# Patient Record
Sex: MALE | Race: OTHER | HISPANIC OR LATINO | URBAN - METROPOLITAN AREA
[De-identification: names, ages, dates, MRNs, and addresses within clinical notes are randomized per-mention and may not be internally consistent; named-entity substitution may affect disease eponyms.]

---

## 2022-09-19 ENCOUNTER — EMERGENCY (EMERGENCY)
Facility: HOSPITAL | Age: 32
LOS: 1 days | Discharge: HOME | End: 2022-09-19
Attending: STUDENT IN AN ORGANIZED HEALTH CARE EDUCATION/TRAINING PROGRAM
Payer: MEDICAID

## 2022-09-19 VITALS
OXYGEN SATURATION: 100 % | RESPIRATION RATE: 18 BRPM | DIASTOLIC BLOOD PRESSURE: 90 MMHG | TEMPERATURE: 99 F | WEIGHT: 160.06 LBS | SYSTOLIC BLOOD PRESSURE: 150 MMHG | HEART RATE: 71 BPM

## 2022-09-19 DIAGNOSIS — S42.301A UNSPECIFIED FRACTURE OF SHAFT OF HUMERUS, RIGHT ARM, INITIAL ENCOUNTER FOR CLOSED FRACTURE: ICD-10-CM

## 2022-09-19 PROCEDURE — 24505 CLTX HUMRL SHFT FX W/MNPJ: CPT | Mod: RT

## 2022-09-19 PROCEDURE — 73060 X-RAY EXAM OF HUMERUS: CPT

## 2022-09-19 PROCEDURE — 96372 THER/PROPH/DIAG INJ SC/IM: CPT | Mod: XU

## 2022-09-19 PROCEDURE — 99285 EMERGENCY DEPT VISIT HI MDM: CPT | Mod: 25

## 2022-09-19 PROCEDURE — 99284 EMERGENCY DEPT VISIT MOD MDM: CPT

## 2022-09-19 PROCEDURE — 73060 X-RAY EXAM OF HUMERUS: CPT | Mod: 26,RT,76

## 2022-09-19 RX ORDER — IBUPROFEN 200 MG
1 TABLET ORAL
Qty: 20 | Refills: 0
Start: 2022-09-19 | End: 2022-09-23

## 2022-09-19 RX ORDER — IBUPROFEN 200 MG
600 TABLET ORAL ONCE
Refills: 0 | Status: COMPLETED | OUTPATIENT
Start: 2022-09-19 | End: 2022-09-19

## 2022-09-19 RX ORDER — MORPHINE SULFATE 50 MG/1
6 CAPSULE, EXTENDED RELEASE ORAL ONCE
Refills: 0 | Status: DISCONTINUED | OUTPATIENT
Start: 2022-09-19 | End: 2022-09-19

## 2022-09-19 RX ADMIN — MORPHINE SULFATE 6 MILLIGRAM(S): 50 CAPSULE, EXTENDED RELEASE ORAL at 20:36

## 2022-09-19 RX ADMIN — MORPHINE SULFATE 6 MILLIGRAM(S): 50 CAPSULE, EXTENDED RELEASE ORAL at 19:31

## 2022-09-19 NOTE — PROCEDURE NOTE - NSICDXPROCEDURE_GEN_ALL_CORE_FT
PROCEDURES:  Closed reduction of fracture of shaft of right humerus 19-Sep-2022 19:38:33  Marcos Fleming

## 2022-09-19 NOTE — ED PROVIDER NOTE - PATIENT PORTAL LINK FT
You can access the FollowMyHealth Patient Portal offered by [facility] by registering at the following website: [email or web address]. By joining [employer]’s FollowMyHealth portal, you will also be able to view your health information using other applications (apps) compatible with our system.

## 2022-09-19 NOTE — PROCEDURE NOTE - NSPERIPVASCEVA_GEN_A_CORE
fingers/toes warm to touch/no paresthesia/swelling/no cyanosis of extremity/capillary refill time < 2 seconds/pre-application: responses intact/post-application: responses intact

## 2022-09-19 NOTE — ED PROVIDER NOTE - PHYSICAL EXAMINATION
Right humerus area with large soft tissue swelling and mid humerus tenderness. No clavicular or AC joint tend. no deformities of shoulder, radial ulnar pulses intact 2+. no spinal tenderness. Right humerus area with large soft tissue swelling and mid humerus tenderness. No sensory deficits. Difficulty moving thumb. Able to ABduct fingers.  No clavicular or AC joint tend. no deformities of shoulder, radial ulnar pulses intact 2+. No spinal tenderness.

## 2022-09-19 NOTE — PROCEDURE NOTE - NSFINDINGS_GEN_A_CORE
not in ideal, but in acceptable position. likely due to hematoma, musculature, and the bone spike preventing complete 100% reduction/reduction completed

## 2022-09-19 NOTE — ED PROVIDER NOTE - NSFOLLOWUPINSTRUCTIONS_ED_ALL_ED_FT
Follow up with Dr Tovar within 1 week.  If you experience any new or worsening symptoms or if you are concerned you can always come back to the emergency for a re-evaluation.  If there were any prescriptions given to you during the visit today take them as prescribed. If you have any questions you can ask the pharmacist.

## 2022-09-19 NOTE — ED PROVIDER NOTE - ATTENDING APP SHARED VISIT CONTRIBUTION OF CARE
I was physically present for the E/M service provided. I agree with above history, physical, and plan which I have reviewed and edited where appropriate. I was physically present for the key portions of the service provided.     well appearing male, no acute distress, normal work of breathing, right arm held in abduction. xray showing mid-humerus shaft fracture. patient seen by ortho, splinted and cleared for outpatient followup with Dr. Tovar.

## 2022-09-19 NOTE — ED PROVIDER NOTE - NS ED ATTENDING STATEMENT MOD
This was a shared visit with the LEEANNA. I reviewed and verified the documentation and independently performed the documented:

## 2022-09-19 NOTE — ED PROVIDER NOTE - OBJECTIVE STATEMENT
32 year old male with no significant pmhx presents with right arm injury earlier today. Patient was doing squats with bar bell. When he lost balance, he fell backwards and twisted his right arm backwards. No complaints of sharp pain in right humerus, numbness or other complaints. NKDA.

## 2022-09-19 NOTE — CONSULT NOTE ADULT - SUBJECTIVE AND OBJECTIVE BOX
Pt Name: HELEN SANCHEZ  MRN: [id number]    ORTHOPEDIC CONSULT    Orthopedic diagnosis:    32yMaleHPI: 32 year old male with no significant pmhx presents with right arm injury earlier today. Patient was doing squats with bar bell. When he lost balance, he fell backwards and twisted his right arm backwards. No complaints of sharp pain in right humerus, numbness or other complaints. NKDA.    Pt seen in ED. Right hand dominant s/p injury, falling back while holding a barbell over his shoulders. When he got up, he noticed his arm was strange and behind him. He wrapped up his arm and came to the ED. Pt denies Chest pain, SOB, dyspnea, paresthesias, N/V/D, abdominal pain, syncope, or pain anywhere else.   History of surgeries on his legs for spastic muscles in his low back as a child, but denies any issues with is upper extremities.       AMBULATION: Baseline Ambulation  [  xxxxx   ] independent   [     ] With Cane   [     ] With Walker   [     ]  Bedbound   [     ] Pivot transfers to Wheelchair only    4M's age-friendly:  - Medication: age-appropriate  - Mentation:  - Mobility:   - Matters-Most/Goals of Care:    PAST MEDICAL & SURGICAL HISTORY:      ALLERGIES: No Known Allergies      MEDICATIONS: morphine  - Injectable 6 milliGRAM(s) IntraMuscular Once      PHYSICAL EXAM:    Vital Signs Last 24 Hrs  T(C): 37.2 (19 Sep 2022 16:40), Max: 37.2 (19 Sep 2022 16:40)  T(F): 98.9 (19 Sep 2022 16:40), Max: 98.9 (19 Sep 2022 16:40)  HR: 71 (19 Sep 2022 16:40) (71 - 71)  BP: 150/90 (19 Sep 2022 16:40) (150/90 - 150/90)  BP(mean): --  RR: 18 (19 Sep 2022 16:40) (18 - 18)  SpO2: 100% (19 Sep 2022 16:40) (100% - 100%)    Parameters below as of 19 Sep 2022 16:40  Patient On (Oxygen Delivery Method): room air        Gen: well developed, well nourished, comfortable  MSK:  Skin pink, warm. No open lesions.  no ct  calves soft  DP/PT 2+, brisk b/l  nvi silt  5/5 strength ehl/ta/gastroc b/l.  RUE: wrapped in coban by ED, the patient's elbow is flexed in coban.  large area of ecchymosis noted in the posterior distal arm by the tricep.   Unable to abduct the 1st digit, 4th and 5th finger had difficulty extending, spastic motions noted.   skin intact. sensation intact. grossly nvi. distal pulses 2+/       LABS:      RADIOLOGY:   XRAY Right humerus shows midshaft fracture of the right humerus with angular displacement.       A/P:   32y Male with: Right humerus midshaft fx.     #  -  Recommendation: [ XXXX ] Conservative treatment     -  All the patient's questions were answered. Pt was explained the Plan, mentioned above, thoroughly.  Risks/benefits, complications were also explained, which includes but not limited to: persistent, infection, death, PNA, thrombombolism, etc. Pt understands.     - plan is for ED closed reduction without sedation and coaptation splint  NWB To the RUE in splint and sling.   -  Pain control  -  Case d/w Dr. Tovar  -  pt to followup with outpt ortho for surgery  -  spasm of the hand likely a temporary neuropraxia due to the stretching of the nerve. likely to resolve with reduction.  Pt Name: HELEN SANCHEZ  MRN: [id number]    ORTHOPEDIC CONSULT    Orthopedic diagnosis:    32yMaleHPI: 32 year old male with no significant pmhx presents with right arm injury earlier today. Patient was doing squats with bar bell. When he lost balance, he fell backwards and twisted his right arm backwards. No complaints of sharp pain in right humerus, numbness or other complaints. NKDA.    Pt seen in ED. Right hand dominant s/p injury, falling back while holding a barbell over his shoulders. When he got up, he noticed his arm was strange and behind him. He wrapped up his arm and came to the ED. Pt denies Chest pain, SOB, dyspnea, paresthesias, N/V/D, abdominal pain, syncope, or pain anywhere else.   History of surgeries on his legs for spastic muscles in his low back as a child, but denies any issues with is upper extremities.       AMBULATION: Baseline Ambulation  [  xxxxx   ] independent   [     ] With Cane   [     ] With Walker   [     ]  Bedbound   [     ] Pivot transfers to Wheelchair only    4M's age-friendly:  - Medication: age-appropriate  - Mentation:  - Mobility:   - Matters-Most/Goals of Care:    PAST MEDICAL & SURGICAL HISTORY:      ALLERGIES: No Known Allergies      MEDICATIONS: morphine  - Injectable 6 milliGRAM(s) IntraMuscular Once      PHYSICAL EXAM:    Vital Signs Last 24 Hrs  T(C): 37.2 (19 Sep 2022 16:40), Max: 37.2 (19 Sep 2022 16:40)  T(F): 98.9 (19 Sep 2022 16:40), Max: 98.9 (19 Sep 2022 16:40)  HR: 71 (19 Sep 2022 16:40) (71 - 71)  BP: 150/90 (19 Sep 2022 16:40) (150/90 - 150/90)  BP(mean): --  RR: 18 (19 Sep 2022 16:40) (18 - 18)  SpO2: 100% (19 Sep 2022 16:40) (100% - 100%)    Parameters below as of 19 Sep 2022 16:40  Patient On (Oxygen Delivery Method): room air        Gen: well developed, well nourished, comfortable  MSK:  Skin pink, warm. No open lesions.  no ct  calves soft  DP/PT 2+, brisk b/l  nvi silt  5/5 strength ehl/ta/gastroc b/l.  RUE: wrapped in coban by ED, the patient's elbow is flexed in coban.  large area of ecchymosis noted in the posterior distal arm by the tricep.   Unable to abduct the 1st digit, 4th and 5th finger had difficulty extending, spastic motions noted.   upon exam of the LUE, however, it shows that the spasticity of abducting and extending the digits is symmetrical to both hands and is not isolated to the Right hand only- likely baseline.   skin intact. sensation intact. grossly nvi. distal pulses 2+/       LABS:      RADIOLOGY:   XRAY Right humerus shows midshaft fracture of the right humerus with angular displacement.       A/P:   32y Male with: Right humerus midshaft fx.     #  -  Recommendation: [ XXXX ] Conservative treatment     -  All the patient's questions were answered. Pt was explained the Plan, mentioned above, thoroughly.  Risks/benefits, complications were also explained, which includes but not limited to: persistent, infection, death, PNA, thrombombolism, etc. Pt understands.     - plan is for ED closed reduction without sedation and coaptation splint  NWB To the RUE in splint and sling.   -  Pain control  -  Case d/w Dr. Tovar  -  pt to followup with outpt ortho for surgery  -  spasm of the hand likely a temporary neuropraxia due to the stretching of the nerve. likely to resolve with reduction.

## 2022-09-19 NOTE — ED PROVIDER NOTE - CARE PROVIDER_API CALL
Davey Tovar)  Orthopaedic Surgery; Sports Medicine  [address] [place] [address], [address] [place] [address]  Phone: [phone]  Fax: [phone]  Follow Up Time:

## 2022-09-19 NOTE — ED ADULT TRIAGE NOTE - ARRIVAL FROM
Consent: The patient's consent was obtained including but not limited to risks of crusting, scabbing, blistering, scarring, darker or lighter pigmentary change, recurrence, incomplete removal and infection. Render Post-Care Instructions In Note?: no Post-Care Instructions: I reviewed with the patient in detail post-care instructions. Patient is to wear sunprotection, and avoid picking at any of the treated lesions. Pt may apply Vaseline to crusted or scabbing areas. Duration Of Freeze Thaw-Cycle (Seconds): 0 Home Total Number Of Aks Treated: 4 Detail Level: Zone

## 2022-09-19 NOTE — ED PROVIDER NOTE - CLINICAL SUMMARY MEDICAL DECISION MAKING FREE TEXT BOX
32 year old male with no significant PMHX presents with right arm injury today. Currently significant swelling to right humerus. Radial ulnar pulses intact 2+. with no sensory deficits. X-ray show displaced transverse fracture of midshaft of humerus. Plan: ortho consult, possible admission.

## 2022-09-19 NOTE — ED ADULT NURSE NOTE - OBJECTIVE STATEMENT
States he was doing squats when he lost his balance and fell , c/o pain to right upper arm.Denies head injury or LOC .

## 2022-09-19 NOTE — ED PROVIDER NOTE - PROGRESS NOTE DETAILS
XR reviewed. Ortho consulted and team performed closed reduction. Patient feels much better with pain control and movement of fingers. Plan: outpatient follow up with Dr Tovar. Pt is well appearing walking with steady gait, stable for discharge and follow up without fail with medical doctor. I had a detailed discussion with the patient and/or guardian regarding the historical points, exam findings, and any diagnostic results supporting the discharge diagnosis. Pt educated on care and need for follow up. Strict return instructions and red flag signs and symptoms discussed with patient. Questions answered. Pt shows understanding of discharge information and agrees to follow.

## 2022-09-21 NOTE — ED POST DISCHARGE NOTE - REASON FOR FOLLOW-UP
Other Patient called back. Reports that couldn't make appointment with Dr Tovar due to insurance. Says that medicaid will be activated in 24 hours. Says still mild numbness as 2 days ago. Reports that able to make thumbs up and spread fingers. Pain is a little worse. Explained to patient to contact the care coordinator for assistance to make appointment. Explained red flags to come back to the ER.

## 2022-09-23 ENCOUNTER — EMERGENCY (EMERGENCY)
Facility: HOSPITAL | Age: 32
LOS: 1 days | Discharge: HOME | End: 2022-09-23
Attending: STUDENT IN AN ORGANIZED HEALTH CARE EDUCATION/TRAINING PROGRAM
Payer: MEDICAID

## 2022-09-23 VITALS
RESPIRATION RATE: 16 BRPM | HEART RATE: 87 BPM | DIASTOLIC BLOOD PRESSURE: 83 MMHG | TEMPERATURE: 98 F | WEIGHT: 165.35 LBS | OXYGEN SATURATION: 97 % | SYSTOLIC BLOOD PRESSURE: 169 MMHG

## 2022-09-23 PROCEDURE — 73060 X-RAY EXAM OF HUMERUS: CPT | Mod: 26,RT

## 2022-09-23 PROCEDURE — 99284 EMERGENCY DEPT VISIT MOD MDM: CPT

## 2022-09-23 PROCEDURE — 73060 X-RAY EXAM OF HUMERUS: CPT

## 2022-09-23 PROCEDURE — 99283 EMERGENCY DEPT VISIT LOW MDM: CPT | Mod: 25

## 2022-09-23 NOTE — ED PROVIDER NOTE - NS ED ROS FT
Review of Systems    Constitutional: (-) fever, (-) chills, (-) fatigue  Cardiovascular: (-) chest pain, (-) syncope  Respiratory: (-) cough, (-) shortness of breath  Gastrointestinal: (-) vomiting, (-) diarrhea, (-) abdominal pain  Musculoskeletal: (-) neck pain, (-) back pain  Integumentary: (-) rash, (-) edema, (-) wound  Neurological: (-) headache, (-) altered mental status    Except as documented in the HPI, all other systems are negative.

## 2022-09-23 NOTE — ED PROVIDER NOTE - PATIENT PORTAL LINK FT
You can access the FollowMyHealth Patient Portal offered by [facility] by registering at the following website: [email or web address]. By joining [employer]’s FollowMyHealth portal, you will also be able to view your health information using other applications (apps) compatible with our system.

## 2022-09-23 NOTE — ED PROVIDER NOTE - PROGRESS NOTE DETAILS
Yon-: no significant change from prior post-reduction XR per my wet read. Extremity NVI. Pt states he has ortho follow up appointment in 3 days. Will DC with ortho follow up with return precautions. Pt understood and agreeable with plan.

## 2022-09-23 NOTE — ED PROVIDER NOTE - NSFOLLOWUPINSTRUCTIONS_ED_ALL_ED_FT
Fracture    A fracture is a break in one of your bones. This can occur from a variety of injuries, especially traumatic ones. Symptoms include pain, bruising, or swelling. Do not use the injured limb. If a fracture is in one of the bones below your waist, do not put weight on that limb unless instructed to do so by your healthcare provider. Crutches or a cane may have been provided. A splint or cast may have been applied by your health care provider. Make sure to keep it dry and follow up with an orthopedist as instructed.    Take over the counter acetaminophen (Tylenol) 650-1000 mg every 4-6 hours as needed for pain. Do not take more than 3000 mg in a 24 hour period. Be aware many over the counter and prescription medications also contain acetaminophen (Tylenol).     Take over the counter ibuprofen 400-600 mg every 6 hours with food as needed for pain.  Do not take these medications if you do not have pain or if you have any history of bleeding disorder or, kidney disease. Do not use ibuprofen if you are on blood thinners (anti-coagulation).     SEEK IMMEDIATE MEDICAL CARE IF YOU HAVE ANY OF THE FOLLOWING SYMPTOMS: numbness, tingling, increasing pain, or weakness in any part of the injured limb.

## 2022-09-23 NOTE — ED PROVIDER NOTE - OBJECTIVE STATEMENT
32 year old male with no pertinent PMH presents with arm pain x 4 days. Pt seen at [facility] ED 4 days ago after injuring his right arm while doing squats, had displaced right humerus fx, had closed reduction and splint placed. Pt returning today because he "feels like [his] bones shifted." Denies any new injury or trauma. Pt states paresthesia to right dorsal hand improved since injury. Denies any fevers, numbness, weakness, or rash. Pt states pain improved, has not required oxycodone. Denies any additional complaints.

## 2022-09-23 NOTE — ED PROVIDER NOTE - PHYSICAL EXAMINATION
CONSTITUTIONAL: non-toxic, well appearing  SKIN: no rash, no petechiae.  EYES: pink conjunctiva, anicteric  NECK: Supple; FROM  CARD: extremities warm, dry, well perfused  RESP: no respiratory distress  ABD: non-tender  EXT: No edema. Hand 5/5 strength with sensation intact throughout median, radial, and ulnar nerve distributions, extremity warm, pink, well perfused  NEURO: Alert, oriented.  PSYCH: Cooperative, appropriate.

## 2022-09-23 NOTE — ED PROVIDER NOTE - CLINICAL SUMMARY MEDICAL DECISION MAKING FREE TEXT BOX
Alexa: 32 year old male with no pertinent PMH presents with arm pain x 4 days. Pt seen at [facility] ED 4 days ago after injuring his right arm while doing squats, had displaced right humerus fx, had closed reduction and splint placed. Pt returning today because he "feels like [his] bones shifted." Denies any new injury or trauma. Pt states paresthesia to right dorsal hand improved since injury. Denies any fevers, numbness, weakness, or rash. Pt states pain improved, has not required oxycodone. Extremity NVI. Plan includes XR to eval fx with dispo pending workup.

## 2022-09-24 PROBLEM — Z78.9 OTHER SPECIFIED HEALTH STATUS: Chronic | Status: ACTIVE | Noted: 2022-09-19

## 2022-09-27 NOTE — ED ADULT TRIAGE NOTE - STATUS:
ORTHOPAEDIC SURGERY H&P / CONSULTATION NOTE    Chief complaint:   Chief Complaint   Patient presents with    Shoulder Pain     CK R SHOULDER        History of present illness: The patient is a 55 y.o. male right hand dominant with subjective symptoms of right shoulder pain. The chief complaint is located at lateral aspect right shoulder as well as occasional anterior. Duration of symptoms has been for several years on again off again but worsening over the last several months. The severity of symptoms is rated at 5/10 pain but can be as high as 9/10 pain with lifting objects on intake form. Patient states that he has had hurting on again off again for the right shoulder. He states that he was working a big [employer] project over the last several months and felt that he had overused the shoulder. He has lateral based shoulder pain. Its not necessarily waking him up at night. He is done formal physical therapy for several sessions and ultimately was referred given his symptoms were still progressing and present. He states dull throbbing achy pain. He has pain with overhead motion. He also has pain with lifting forward heavy things. He denies anti-inflammatory use on a consistent basis. He denies injections. The patient has tried the below listed items prior to today's consultation for above listed chief complaint.     -   Over-the-counter anti-inflammatories/prescription medication anti-inflammatory.      +   Physical therapy / guided home exercise program 3 visits     -   Previous corticosteroid injections    Past medical history:    Past Medical History:   Diagnosis Date    Allergic rhinitis     Cancer ([address]     basal cell skin    PONV (postoperative nausea and vomiting)     Psoriasis         Past surgical history:    Past Surgical History:   Procedure Laterality Date    APPENDECTOMY      COLONOSCOPY  2007    COLONOSCOPY  08/25/2017    MOHS SURGERY  2009    VASECTOMY      WISDOM TOOTH EXTRACTION Allergies:  No Known Allergies      Medications:   Current Outpatient Medications:     meloxicam (MOBIC) 15 MG tablet, Take 1 tablet by mouth daily as needed for Pain, Disp: 30 tablet, Rfl: 0    DULoxetine (CYMBALTA) 60 MG extended release capsule, Take 1 capsule by mouth daily, Disp: 90 capsule, Rfl: 1    lamoTRIgine (LAMICTAL) 100 MG tablet, Take 1 tablet by mouth daily, Disp: 90 tablet, Rfl: 3    L-Methylfolate 15 MG TABS, Take 1 tablet by mouth daily, Disp: 90 tablet, Rfl: 1    azelastine (ASTELIN) 0.1 % nasal spray, 1 spray by Nasal route 2 times daily Use in each nostril as directed, Disp: 30 mL, Rfl: 3    L-Methylfolate-Algae (DEPLIN 15 PO), Take by mouth, Disp: , Rfl:     fluticasone (FLONASE) 50 MCG/ACT nasal spray, 1 spray by Nasal route daily (Patient taking differently: 1 spray by Nasal route as needed), Disp: 1 Bottle, Rfl: 5    econazole nitrate 1 % cream, Apply topically daily Apply topically daily. , Disp: , Rfl:     Hydrocortisone Butyrate 0.1 % CREA, Apply topically daily , Disp: , Rfl:     naphazoline-pheniramine (NAPHCON-A) 0.025-0.3 % ophthalmic solution, Place 1 drop into both eyes as needed , Disp: , Rfl:     cetirizine (ZYRTEC) 10 MG tablet, Take 10 mg by mouth daily, Disp: , Rfl:     Multiple Vitamins-Minerals (THERAPEUTIC MULTIVITAMIN-MINERALS) tablet, Take 1 tablet by mouth daily (Patient not taking: Reported on 8/16/2022), Disp: , Rfl:     Potassium 99 MG TABS, Take by mouth daily  (Patient not taking: Reported on 8/16/2022), Disp: , Rfl:      Social history: Denies IV drug use.     Social History     Socioeconomic History    Marital status:      Spouse name: Not on file    Number of children: Not on file    Years of education: Not on file    Highest education level: Not on file   Occupational History    Not on file   Tobacco Use    Smoking status: Never    Smokeless tobacco: Never   Vaping Use    Vaping Use: Never used   Substance and Sexual Activity    Alcohol use: No    Drug use: No    Sexual activity: Never   Other Topics Concern    Not on file   Social History Narrative    Not on file     Social Determinants of Health     Financial Resource Strain: Not on file   Food Insecurity: Not on file   Transportation Needs: Not on file   Physical Activity: Not on file   Stress: Not on file   Social Connections: Not on file   Intimate Partner Violence: Not on file   Housing Stability: Not on file     Tobacco use. Social History     Tobacco Use   Smoking Status Never   Smokeless Tobacco Never     Employment: Noncontributory    Workers compensation claim: Noncontributory    Review of systems: Patient denies any fevers chills chest pain shortness of breath nausea vomiting significant weight loss any change in voiding or bowel movements. Patient denies any significant numbness or tingling at baseline as it relates to this presenting symptom/chief complaint. The patient denies any significant problems with skin or any significant allergies. Physical examination:  There is no height or weight on file to calculate BMI. AAOx3, NCAT  EOMI  MMM  RR  Unlabored breathing, no wheezing  Skin intact BUE and BLE, warm and moist  Bilateral upper extremity examination specific to subjective symptoms  Exam Right Shoulder                                                Active Range of Motion (FF/Abd/ER/IR)         160/160/40/T12 secondary limitation for pain  Passive Range of Motion (FF/Abd/ER/IR)      170/170  Positive   Neer,    positive Thomas,      5/5   empty Can,          5/5 ER arm at the side,       5/5   belly Press,      5/5 bear Hug,       equivocal O'Briens,      trace TTP at Biceps Tendon Sheath,     trace Speed, trace Yergeson, none   TTP AC Joint, negative cross arm adduction,           Skin intact throughout  5/5 D B T G IO EPL  SILT Ax, R, U, M  +2 radial pulse    Diagnostic imaging:  MY READ:  4 view right shoulder 9/27/2022: Negative fracture.   Mild arthrosis glenohumeral joint and with mild arthrosis acromioclavicular joint. Pertinent lab work:  None       Diagnosis Orders   1. Tendinitis of right rotator cuff  meloxicam (MOBIC) 15 MG tablet    83572 - LA DRAIN/INJECT LARGE JOINT/BURSA    triamcinolone acetonide (KENALOG-40) injection 40 mg    ropivacaine (NAROPIN) 0.5% injection 30 mL      2. Biceps tendonitis on right  meloxicam (MOBIC) 15 MG tablet      3. Right shoulder pain, unspecified chronicity  XR SHOULDER RIGHT (MIN 2 VIEWS)    43943 - LA DRAIN/INJECT LARGE JOINT/BURSA          Assessment and plan: 55 y.o. male with current subjective symptoms and physical exam findings with diagnostic imaging correlating to right shoulder subacromial bursitis/rotator cuff tendinitis and bicipital tenosynovitis. -Time of 16 minutes was spent coordinating and discussing the clinical findings, reviewing diagnostic imaging as indicated, coordinating care with prior notes review and current clinical encounter documentation as it pertains to the patient's presenting subjective symptoms and diagnoses. -I reviewed with the patient the imaging findings as well as clinical exam and  how it correlates to subjective symptoms.  -I had a pleasant discussion with the patient today. I reviewed with him consideration for conservative care treatment options given the above listed findings correlating to his subjective symptoms. This included anti-inflammatories and therapy. -Mobic 15 mg p.o. daily as needed pain. OTC Tylenol per bottle as needed discomfort  -Patient is already been doing formal physical therapy and so he will continue a good home exercise program that has been taught by them but a physician directed physical therapy program with [facility] was also printed out and provided to the patient today.  -I recommended low impact activity  -Given the patient's duration of symptoms acute on chronic in nature with exacerbation over the last several months.   I did offer patient a corticosteroid injection to the point of maximal pain today. This would be in the subacromial space for treatment above listed diagnoses rotator cuff tendinitis/bursitis. Understand the risk and benefits of this, the patient wishes to proceed  --The patient was educated to the risks (infection and skin blanching to name a few) and benefits of the injection and understanding these risks and benefits, the patient wished to proceed and a verbal consent was obtained. If the patient verbalized the presence of diabetes or rheumatologic condition on chronic immunosuppresseants as a comorbidity upon direct questioning, an additional discussion was had detailing the potential increased risk of infection and potential increase in FSBG and to monitor FSBG and adjust medications as needed.  -After sterile prep of the right shoulder, a 5 cc corticosteroid injection (4cc ropivicaine and 1cc kenolog 40) was administered into the subacromial space right shoulder. The patient tolerated the procedure well and started to have immediate improvement in pain/symptoms.  -All questions answered to the patient's satisfaction and the patient expressed understanding and agreement with the above listed treatment plan  -Follow up in 4 to 6 weeks time should the patient still have pain he will follow-up for discussion of potential additional treatment options to include advanced imaging versus additional diagnostic and therapeutic steroid injections pending examination patient's subjective symptoms  -Thank you for the clinical consultation and allowing me to participate in the patient's care. Electronically signed by Sharona De La Cruz MD on 9/27/22 at 1:59 PM EDT         Sharona De La Cruz MD       Orthopaedic Surgery-Sports Medicine        Disclaimer: This note was dictated with voice recognition software. Though review and correction are routinely performed, please contact the office/medical records for any errors requiring correction. [employer]

## 2022-09-29 ENCOUNTER — APPOINTMENT (OUTPATIENT)
Dept: CT IMAGING | Facility: HOSPITAL | Age: 32
End: 2022-09-29

## 2022-10-01 ENCOUNTER — EMERGENCY (EMERGENCY)
Facility: HOSPITAL | Age: 32
LOS: 1 days | Discharge: HOME | End: 2022-10-01
Attending: EMERGENCY MEDICINE
Payer: MEDICAID

## 2022-10-01 VITALS
HEART RATE: 97 BPM | TEMPERATURE: 98 F | OXYGEN SATURATION: 97 % | WEIGHT: 160.06 LBS | SYSTOLIC BLOOD PRESSURE: 151 MMHG | HEIGHT: 69 IN | DIASTOLIC BLOOD PRESSURE: 95 MMHG | RESPIRATION RATE: 18 BRPM

## 2022-10-01 PROCEDURE — 73060 X-RAY EXAM OF HUMERUS: CPT | Mod: 26,RT

## 2022-10-01 PROCEDURE — 99284 EMERGENCY DEPT VISIT MOD MDM: CPT

## 2022-10-01 PROCEDURE — 82962 GLUCOSE BLOOD TEST: CPT

## 2022-10-01 PROCEDURE — 99283 EMERGENCY DEPT VISIT LOW MDM: CPT | Mod: 25

## 2022-10-01 PROCEDURE — 73060 X-RAY EXAM OF HUMERUS: CPT

## 2022-10-01 RX ORDER — IBUPROFEN 200 MG
600 TABLET ORAL ONCE
Refills: 0 | Status: COMPLETED | OUTPATIENT
Start: 2022-10-01 | End: 2022-10-01

## 2022-10-01 RX ADMIN — Medication 600 MILLIGRAM(S): at 04:19

## 2022-10-01 NOTE — ED PROVIDER NOTE - PATIENT PORTAL LINK FT
You can access the FollowMyHealth Patient Portal offered by [facility] by registering at the following website: [email or web address]. By joining [employer]’s FollowMyHealth portal, you will also be able to view your health information using other applications (apps) compatible with our system.

## 2022-10-01 NOTE — ED PROVIDER NOTE - OBJECTIVE STATEMENT
32 yr old male with hx of displaced right humerus fx, had closed reduction and splint placed presents to ed c/o increase swelling 32 yr old male with hx of displaced right humerus fx, had closed reduction and splint placed presents to ed c/o increase swelling, numbness and flushed arm earlier this am while lying down in bed. noticed it pinching his right chest wall. no loss of mobility. using the sling as indicated.

## 2022-10-01 NOTE — ED PROVIDER NOTE - PROGRESS NOTE DETAILS
Bridges: pain improve. neurovascularly intact. xr- improve aligment, no gas. dc home with compartment syndrome precautions and information. pt has appt schedule with ortho. maintain splint and sling

## 2022-10-01 NOTE — ED PROVIDER NOTE - MUSCULOSKELETAL, MLM
RUE- coaptation splint in place with sling. radial pulse 2 +, moderate swelling at hands and forearm, compartments soft. moving digits freely. no pain.

## 2022-10-01 NOTE — ED PROVIDER NOTE - NEUROLOGICAL, MLM
Alert and oriented, no focal deficits, no motor or sensory deficits. Alert and oriented, no focal deficits, no motor or sensory deficits. 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

## 2022-10-01 NOTE — ED PROVIDER NOTE - CLINICAL SUMMARY MEDICAL DECISION MAKING FREE TEXT BOX
increase swelling, known fx 32 yr old male with hx of displaced right humerus fx, had closed reduction and splint placed presents to ed c/o increase swelling, numbness and flushed arm earlier this am while lying down in bed. noticed it pinching his right chest wall. no loss of mobility. using the sling as indicated.     RUE swelling and pain - no sign of compartment syndrome neg to all P's. no clinical evidence of malaligment, neurovascularly intact. likely normal process of healing with intermitted pain/numbness from the swelling. - xr, motrin, maintain splint.

## 2022-10-07 ENCOUNTER — TRANSCRIPTION ENCOUNTER (OUTPATIENT)
Age: 32
End: 2022-10-07

## 2023-03-07 NOTE — ED PROVIDER NOTE - CONDITION AT DISCHARGE:
Improved Melolabial Interpolation Flap Text: A decision was made to reconstruct the defect utilizing an interpolation axial flap and a staged reconstruction.  A telfa template was made of the defect.  This telfa template was then used to outline the melolabial interpolation flap.  The donor area for the pedicle flap was then injected with anesthesia.  The flap was excised through the skin and subcutaneous tissue down to the layer of the underlying musculature.  The pedicle flap was carefully excised within this deep plane to maintain its blood supply.  The edges of the donor site were undermined.   The donor site was closed in a primary fashion.  The pedicle was then rotated into position and sutured.  Once the tube was sutured into place, adequate blood supply was confirmed with blanching and refill.  The pedicle was then wrapped with xeroform gauze and dressed appropriately with a telfa and gauze bandage to ensure continued blood supply and protect the attached pedicle.

## 2024-05-12 NOTE — PROCEDURE NOTE - NSSPLINTTYPE_MUSCL_A_CORE
COAPTATION SPLINT You can access the FollowMyHealth Patient Portal offered by [facility] by registering at the following website: [email or web address]. By joining [facility]’s FollowMyHealth portal, you will also be able to view your health information using other applications (apps) compatible with our system.